# Patient Record
Sex: FEMALE | Race: WHITE | NOT HISPANIC OR LATINO | Employment: UNEMPLOYED
[De-identification: names, ages, dates, MRNs, and addresses within clinical notes are randomized per-mention and may not be internally consistent; named-entity substitution may affect disease eponyms.]

---

## 2017-09-10 ENCOUNTER — HEALTH MAINTENANCE LETTER (OUTPATIENT)
Age: 42
End: 2017-09-10

## 2021-04-06 ENCOUNTER — MEDICAL CORRESPONDENCE (OUTPATIENT)
Dept: HEALTH INFORMATION MANAGEMENT | Facility: CLINIC | Age: 46
End: 2021-04-06

## 2021-04-06 ENCOUNTER — TRANSFERRED RECORDS (OUTPATIENT)
Dept: HEALTH INFORMATION MANAGEMENT | Facility: CLINIC | Age: 46
End: 2021-04-06

## 2021-06-11 ENCOUNTER — TELEPHONE (OUTPATIENT)
Dept: RHEUMATOLOGY | Facility: CLINIC | Age: 46
End: 2021-06-11

## 2021-06-11 ENCOUNTER — VIRTUAL VISIT (OUTPATIENT)
Dept: RHEUMATOLOGY | Facility: CLINIC | Age: 46
End: 2021-06-11
Payer: COMMERCIAL

## 2021-06-11 DIAGNOSIS — K21.9 GASTROESOPHAGEAL REFLUX DISEASE WITHOUT ESOPHAGITIS: ICD-10-CM

## 2021-06-11 DIAGNOSIS — E11.9 TYPE 2 DIABETES MELLITUS WITHOUT COMPLICATION, WITHOUT LONG-TERM CURRENT USE OF INSULIN (H): ICD-10-CM

## 2021-06-11 DIAGNOSIS — J45.20 MILD INTERMITTENT ASTHMA WITHOUT COMPLICATION: ICD-10-CM

## 2021-06-11 DIAGNOSIS — H05.123 ORBITAL MYOSITIS OF BOTH SIDES: ICD-10-CM

## 2021-06-11 DIAGNOSIS — F41.9 ANXIETY: ICD-10-CM

## 2021-06-11 DIAGNOSIS — F32.A DEPRESSIVE DISORDER: ICD-10-CM

## 2021-06-11 PROCEDURE — 99203 OFFICE O/P NEW LOW 30 MIN: CPT | Mod: 95 | Performed by: STUDENT IN AN ORGANIZED HEALTH CARE EDUCATION/TRAINING PROGRAM

## 2021-06-11 RX ORDER — POLYETHYLENE GLYCOL 3350 17 G/17G
17 POWDER, FOR SOLUTION ORAL
COMMUNITY
Start: 2020-10-22 | End: 2021-10-22

## 2021-06-11 RX ORDER — MULTIPLE VITAMINS W/ MINERALS TAB 9MG-400MCG
1 TAB ORAL DAILY
COMMUNITY

## 2021-06-11 RX ORDER — MONTELUKAST SODIUM 10 MG/1
10 TABLET ORAL
COMMUNITY
Start: 2020-10-05

## 2021-06-11 RX ORDER — BUPROPION HYDROCHLORIDE 150 MG/1
TABLET ORAL
COMMUNITY
Start: 2021-02-03

## 2021-06-11 RX ORDER — LEVOTHYROXINE SODIUM 75 UG/1
75 TABLET ORAL
COMMUNITY
Start: 2020-10-05

## 2021-06-11 RX ORDER — CARVEDILOL 25 MG/1
TABLET, FILM COATED ORAL
COMMUNITY
Start: 2021-03-26

## 2021-06-11 RX ORDER — FAMOTIDINE 20 MG/1
20 TABLET, FILM COATED ORAL
COMMUNITY
Start: 2020-11-30 | End: 2021-11-30

## 2021-06-11 RX ORDER — LYSINE 500 MG
500 TABLET ORAL
COMMUNITY

## 2021-06-11 RX ORDER — ONDANSETRON 4 MG/1
4 TABLET, ORALLY DISINTEGRATING ORAL
COMMUNITY
Start: 2021-05-21

## 2021-06-11 RX ORDER — ALPRAZOLAM 0.25 MG
.25-.5 TABLET ORAL
COMMUNITY
Start: 2021-01-14

## 2021-06-11 RX ORDER — IPRATROPIUM BROMIDE AND ALBUTEROL SULFATE 2.5; .5 MG/3ML; MG/3ML
3 SOLUTION RESPIRATORY (INHALATION)
COMMUNITY
Start: 2021-02-10

## 2021-06-11 RX ORDER — DULOXETIN HYDROCHLORIDE 60 MG/1
CAPSULE, DELAYED RELEASE ORAL DAILY
COMMUNITY
Start: 2021-03-17

## 2021-06-11 RX ORDER — ARMODAFINIL 250 MG/1
TABLET ORAL
COMMUNITY
Start: 2021-02-03

## 2021-06-11 RX ORDER — PROPRANOLOL HYDROCHLORIDE 10 MG/1
TABLET ORAL
COMMUNITY
Start: 2021-05-13

## 2021-06-11 RX ORDER — HYDROXYZINE HYDROCHLORIDE 10 MG/1
10-20 TABLET, FILM COATED ORAL
COMMUNITY
Start: 2021-02-11 | End: 2022-02-11

## 2021-06-11 RX ORDER — CETIRIZINE HYDROCHLORIDE 10 MG/1
10 TABLET ORAL
COMMUNITY
Start: 2021-05-03

## 2021-06-11 NOTE — PROGRESS NOTES
Debra is a 45 year old who is being evaluated via a billable video visit.      How would you like to obtain your AVS? MyChart and mail to home address  If the video visit is dropped, the invitation should be resent by: Send to e-mail at: [email or web address]  Will anyone else be joining your video visit? Riddhi Orantes LPN    Video Start Time: 10:25 AM            Active Problem List:     Patient Active Problem List    Diagnosis Date Noted     Anxiety      Priority: Medium     Mild intermittent asthma without complication      Priority: Medium     Depressive disorder      Priority: Medium     Gastroesophageal reflux disease without esophagitis      Priority: Medium     Type 2 diabetes mellitus without complications (H)      Priority: Medium            History of Present Illness:   Debra Briones is a 45 year old female with PMH of morbid obesity, type 2 diabetes, Asthma, depression, fatigue evaluated via a billable virtual visit in consultation at request of Dr. 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    She reports that her symptoms started in 2013 with double vision.  She also developed severe pain in her head and around the left eye.  The pain was so excruciating that she had to go to the ER.  She had multiple visits to the ER, CT head was normal.  Later she developed pain, redness and tearing of the left eye.  Initially she was treated as possible conjunctivitis versus corneal abrasion but symptoms did not improve.  Later she saw ophthalmologist who treated her with antibiotics for possible preseptal cellulitis.  Symptoms did not improve, subsequently CT orbit with IV contrast was done on 7/17/2013 which revealed enlargement of left medial rectus muscle.  Mild left preseptal thickening and edema with left-sided proptosis.  She was started on 100 mg of prednisone and noticed marked improvement in her symptoms.  She was maintained on prednisone for almost 3 years and gradually it was  reduced. During that time she gained 80 pounds, developed Diabetes. She was off of it from Spring of 2016 and started again in 1/2021. She had no flare up in the interim. Started with 60 mg prednisone in 1/2021 and has tapered it gradually. She had it in another eye too this time but not that intense. She is off of prednisone for 2 weeks now. She has not had any flares after being off of it.     During the flare she gets pain and swelling outside of the orbit. Outside gets puffy and hard. She did not have double vision this time.    She has cervical radiculopathy. She gets trigger point injection in upper back area every 4 weeks. She is seeing weight loss clinic in [facility] for bariatric surgery.   She has PTSD and is doing EMDR therapy.     EMG showed carpal tunnel symptoms, PT was done which helped. She has issues with little finger which is believed it to be C8 radiculopathy.     She has asthma and is smoker, she is trying to quit, using chantix. She had IBS, cholecystectomy at age 24.     She gets recurrent sores in her mouth on the side of the tongue, like canker sores. It happens once a month. She has dry mouth, left eye is dry. No raynaud's.  Brother has iritis.      Autoimmune work-up done in 2015 showed negative ERASMO, KATHRYN panel, negative rheumatoid factor, Deana 1 antibody.    CT of the orbit done in 1/2021 showed mild inflammatory changes within the retroconal region of left orbit, associated mild asymmetric enlargement of the extraocular musculature involving the left orbit.  These findings are compatible with history of orbital inflammation.    No history of psoriasis, ulcerative colitis or chron's disease. No h/o iritis, enthesitis, finger or toe swelling like dactylitis, plantar fascitis or heel pain. Denies any raynauds, malar rash, photosensitivity, recurrent mouth/genital ulcers, pleuritic chest pains, recurrent sinusitis/rhinitis, swallowing difficulty, hearing or visual changes recently. No h/o  arterial/venous thrombosis in the past. Denies any tick bite, recent GI/ infection.             Review of Systems:     Review Of Systems  Constitutional: denies fever, chills, night sweats and weight loss.  Skin: No skin rash.  Eyes: + dryness or irritation in eyes. No episode of eye inflammation or redness.   Ears/Nose/Throat: no recurrent sinus infections.  Respiratory: No shortness of breath, dyspnea on exertion, cough, or hemoptysis  Cardiovascular: no chest pain or palpitations.  Gastrointestinal: no nausea, vomiting, abdominal pain.  Normal bowel movements.  Genitourinary: no dysuria, frequency  or hematuria.  Musculoskeletal: as in HPI  Neurologic: no numbness, tingling.  Psychiatric: no mood disorders.  Hematologic/Lymphatic/Immunologic: no history of easy bruising, petechia or purpura.  No abnormal bleeding.   Endocrine: no h/o thyroid disease or Diabetes.                  Past Medical History:     Past Medical History:   Diagnosis Date     Allergic state      Anxiety      Asthma      Depressive disorder      Gastro-oesophageal reflux disease      Type 2 diabetes mellitus without complications (H)      Past Surgical History:   Procedure Laterality Date     BREAST SURGERY       CHOLECYSTECTOMY              Social History:     Social History     Occupational History     Not on file   Tobacco Use     Smoking status: Current Every Day Smoker     Packs/day: 0.50   Substance and Sexual Activity     Alcohol use: Yes     Drug use: No     Sexual activity: Not on file            Family History:     Family History   Problem Relation Age of Onset     Iritis Brother             Allergies:     Allergies   Allergen Reactions     Animal Dander Hives and Shortness Of Breath     Guinea Pigs - asthma, hives     Feathers Hives and Shortness Of Breath     Dust Mite Extract Hives            Medications:     Current Outpatient Medications   Medication Sig Dispense Refill     Albuterol (VENTOLIN IN) Inhale into the lungs as  needed        ALPRAZolam (XANAX) 0.25 MG tablet Take 0.25-0.5 mg by mouth       ARIPiprazole (ABILIFY PO) Take 5 mg by mouth daily        armodafinil (NUVIGIL) 250 MG TABS tablet TAKE 1 TABLET BY MOUTH EVERY MORNING       blood glucose (CONTOUR NEXT TEST) test strip Test once daily. Dx code  E11.9       blood glucose monitoring (MILAD MICROLET) lancets Test once daily. DX code  E11.9       buPROPion (WELLBUTRIN XL) 150 MG 24 hr tablet TAKE 3 TABLETS BY MOUTH EVERY MORNING       BusPIRone HCl (BUSPAR PO) Take 15 mg by mouth 2 times daily        cetirizine (ZYRTEC) 10 MG tablet Take 10 mg by mouth       Dulaglutide 3 MG/0.5ML SOPN Inject 3 mg Subcutaneous once a week       DULoxetine (CYMBALTA) 60 MG capsule daily       famotidine (PEPCID) 20 MG tablet Take 20 mg by mouth       fluticasone (FLONASE) 50 MCG/ACT nasal spray Spray 2 sprays into both nostrils daily.       fluticasone-vilanterol (BREO ELLIPTA) 200-25 MCG/INH inhaler Inhale 1 puff into the lungs daily       hydrOXYzine (ATARAX) 10 MG tablet Take 10-20 mg by mouth       INULIN PO        ipratropium - albuterol 0.5 mg/2.5 mg/3 mL (DUONEB) 0.5-2.5 (3) MG/3ML neb solution Inhale 3 mLs into the lungs       L-Lysine 500 MG tablet Take 500 mg by mouth       levonorgestrel (MIRENA) 20 MCG/24HR IUD 1 each by Intrauterine route once       levothyroxine (SYNTHROID/LEVOTHROID) 75 MCG tablet Take 75 mcg by mouth       magnesium oxide (MAG-OX) 400 (241.3 Mg) MG tablet Take 400 mg by mouth       METFORMIN HCL PO Take 500 mg by mouth 2 times daily (with meals)        montelukast (SINGULAIR) 10 MG tablet Take 10 mg by mouth       multivitamin w/minerals (MULTI-VITAMIN) tablet Take 1 tablet by mouth daily       OMEPRAZOLE PO Take 20 mg by mouth every morning        ondansetron (ZOFRAN-ODT) 4 MG ODT tab Take 4 mg by mouth       polyethylene glycol (MIRALAX) 17 GM/Dose powder Take 17 g by mouth       propranolol (INDERAL) 10 MG tablet TAKE 1 TABLET BY MOUTH AM/ AT BEDTIME &  TAKE 2 TABLETS AT NOON       SIMVASTATIN PO Take 40 mg by mouth At Bedtime        ClonazePAM (KLONOPIN PO) Take  by mouth.       CONTOUR TEST test strip        Fluticasone-Salmeterol (ADVAIR DISKUS IN) Inhale  into the lungs.       meclizine (ANTIVERT) 12.5 MG tablet Take 4 tablets (50 mg) by mouth 3 times daily as needed for dizziness (Patient not taking: Reported on 6/11/2021) 30 tablet 0     PREDNISONE PO        Ranitidine HCl (ZANTAC PO)        Vilazodone HCl (VIIBRYD PO) Take  by mouth.              Physical Exam:   Vitals not taken.   Wt Readings from Last 4 Encounters:   08/14/15 120.2 kg (265 lb)   04/14/14 120.2 kg (265 lb)   07/06/13 98.9 kg (218 lb)       Constitutional: Morbid obesity appearing stated age; cooperative  MS: No synovitis or tenderness present over MCP, PIP, DIP joints.  No dactylitis, plantar fasciitis, Achilles tendinitis.  No knee effusions.  No tenderness present over MTP joints, bilateral ankles.  Psych: nl judgement, orientation, memory, affect.         Data:     No results found for any visits on 06/11/21.    Recent Labs   Lab Test 08/14/15  1220 04/14/14  1340   WBC  --  9.6   RBC  --  4.77   HGB  --  14.6   HCT  --  45.4   MCV  --  95   RDW  --  13.8   PLT  --  221   BUN 9 11      No results for input(s): TSH, T4 in the last 12407 hours.  Hemoglobin   Date Value Ref Range Status   04/14/2014 14.6 11.7 - 15.7 g/dL Final     Urea Nitrogen   Date Value Ref Range Status   08/14/2015 9 7 - 30 mg/dL Final   04/14/2014 11 5 - 24 mg/dL Final     Recent Labs   Lab Test 08/14/15  1220 04/14/14  1340   WBC  --  9.6   HGB  --  14.6   HCT  --  45.4   MCV  --  95   PLT  --  221   BUN 9 11       Outside studies reviewed: Outside records from [facility] reviewed    CT orbit-7/2013    Impression: Enlargement of the left medial rectus muscle with subtle central hypo-density. Slight  stranding of the intraconal fat. Mild left preseptal thickening and edema with left  sided proptosis. Findings are nonspecific and likely represent an inflammatory versus infectious process, for example myositis versus a peseudotumor. A neoplastic process, such as lymphoma, is considered to be within the differential although less likely given the relatively short interval of time over which this has developed since prior exam. Consider short-term repeat imaging to evaluate for resolution versus progression.     CT orbit-2014    Impression: Significant interval decrease in previously seen left medial rectus muscle thickening. No acute pathology in bilateral orbits.     9/2019-negative HIV, Treponema pallidum, hepatitis B surface antigen, hepatitis C antibody    Reviewed Rheumatology lab flowsheet    Assessment    Left orbital myositis  Prednisone responsive  Steroid-induced diabetes  Morbid obesity  Family history of iritis in brother  Asthma, depression  Negative ERASMO, rheumatoid factor, Deana 1 antibody, normal CK-2015  CT orbit - 2013 -left eye medial rectus myositis    Left orbital myositis: She developed symptoms of headache, eye pain and diplopia in 2013.  She was initially treated for possible conjunctivitis, preseptal cellulitis etc. which did not help.  CT orbit done in 2013 showed left medial rectus inflammation raising concern about orbital myositis.  She responded very well to high-dose prednisone.  She was maintained on prednisone for almost 3 years due to inability to taper.  Her autoimmune work-up done in the past has shown negative ERASMO, rheumatoid factor, Deana 1 antibody, normal CK levels.    She had flareup of left orbital myositis in 1/2021 when she was started back on 60 mg of prednisone.  She has tapered herself off of it completely 2 weeks ago.  She denies any eye symptoms now.     Isolated orbital myositis is a rare but can be seen in patients with autoimmune diseases like systemic lupus, IgG4 related disease, Sjogren's, ANCA vasculitis, sarcoidosis or idiopathic.  Her autoimmune  work-up done in 2015 was unremarkable.      I will repeat her autoimmune serologies including ERASMO, KATHRYN panel, dsDNA, C3-C4, centromere antibody, SCL 70, ANCA titers, IgG4 levels, CK, aldolase, myositis panel, HLA-B27 genotype, ESR, CRP. Her review of systems is however normal other than history of eye inflammation.    Since she is off of prednisone and has no current eye inflammation, will keep her off of the prednisone.  If symptoms flareup again then she will benefit from a steroid sparing agent for long-term treatment.  Options can include DMARDs like methotrexate, Azathioprine, cellcept based on tolerance.  There are some reports of use of rituximab or anti-TNF inihibitors for patients with severe refractory disease.    Long-term steroid use: She has developed her morbid obesity, type 2 diabetes related to long-term steroid use.  She is following up with bariatric clinic for weight loss surgery.      Plan     Blood tests ordered. Please send the orders to [facility]    Follow up in 1 month. If not available then I can see her on July 19th, 12:15        Video-Visit Details    Type of service:  Video Visit    Video End Time:11:00 am     Originating Location (pt. Location): Home    Distant Location (provider location):  [facility]     Platform used for Video Visit: [employer]

## 2021-06-11 NOTE — TELEPHONE ENCOUNTER
Health Call Center    Phone Message    May a detailed message be left on voicemail: yes, [phone]       Reason for Call: Other: [facility] lab is calling and received orders that they are not able to read. They said they received 41 pages. They just need orders, when the labs are needed, diagnosis and Doctor signature. Call back number is  [phone] fax number [phone]    Action Taken: Message routed to:  Adult Clinics: Rheumatology p [phone]    Travel Screening: Not Applicable

## 2021-06-11 NOTE — PATIENT INSTRUCTIONS
Blood tests ordered. Please send the orders to [facility]    Follow up in 1 month. If not available then I can see her on July 19th, 12:15

## 2021-06-11 NOTE — NURSING NOTE
Called and spoke with pt.  Assisted with scheduling Future appt recommended by Dr. Strickland:      Instructions:  Blood tests ordered. Please send the orders to [facility]     Follow up in 1 month. If not available then I can see her on July 19th, 12:15     Future Lab Orders Faxed to [facility] [phone].  This LPN called and obtained Fax number [phone].  Cover sheet used requesting results be Faxed back to Dr. Strickland.  Confirmation Success using RightFax.  Pt will call and schedule her own appt for this.    Sent pt the email link to [employer] sign-up.    Encouraged to call if any further questions or concerns.  AVS printed and mailed to pt.    Elyse Orantes LPN

## 2021-06-14 NOTE — TELEPHONE ENCOUNTER
Lab orders refaxed to [facility] at [phone]. Confirmed successful via right fax. Informed [facility] lab that orders were refaxed, orders were readable.      RADHA Leung [facility]

## 2021-06-17 NOTE — TELEPHONE ENCOUNTER
Pt is calling because she has a lab appt scheduled in [facility] tomorrow, 6/17/21, and is wondering if she needs to be fasting for the labs ordered by Dr. Strickland.    Please call the Pt back to let her know if she needs to be fasting or not.  Ok to leave detailed message if it goes to a voicemail.

## 2021-06-17 NOTE — TELEPHONE ENCOUNTER
Huddled with Dr. Strickland and patient does not need to be fasting. Patient informed of message.      RADHA Leung [facility]

## 2021-06-24 ENCOUNTER — TELEPHONE (OUTPATIENT)
Dept: RHEUMATOLOGY | Facility: CLINIC | Age: 46
End: 2021-06-24

## 2021-06-24 NOTE — TELEPHONE ENCOUNTER
Please review labs collected on 6/18/2021 from [facility] in [facility] everywhere. Thank you.      RADHA Leung [facility]

## 2021-06-29 NOTE — TELEPHONE ENCOUNTER
Labs done at [facility] reviewed.  All her autoimmune work-up is normal.  I checked for diseases like systemic lupus, Sjogren's, mixed connective tissue disease, vasculitis and her tests for those diseases are normal.  Muscle enzyme, CBC, liver, kidney function test and inflammatory markers are normal.  We will discuss further on her follow-up visit.  If she has any flare let us know.

## 2021-06-30 NOTE — TELEPHONE ENCOUNTER
Verified patient viewed results via [employer].      RADHA Leung [facility]

## 2021-07-19 ENCOUNTER — VIRTUAL VISIT (OUTPATIENT)
Dept: RHEUMATOLOGY | Facility: CLINIC | Age: 46
End: 2021-07-19
Payer: COMMERCIAL

## 2021-07-19 DIAGNOSIS — D80.1 HYPOGAMMAGLOBULINEMIA (H): Primary | ICD-10-CM

## 2021-07-19 PROCEDURE — 99213 OFFICE O/P EST LOW 20 MIN: CPT | Mod: 95 | Performed by: STUDENT IN AN ORGANIZED HEALTH CARE EDUCATION/TRAINING PROGRAM

## 2021-07-19 NOTE — PROGRESS NOTES
Debra is a 45 year old who is being evaluated via a billable video visit.      How would you like to obtain your AVS? MyChart  If the video visit is dropped, the invitation should be resent by: Send to e-mail at: [email or web address]  Will anyone else be joining your video visit? No      Video Start Time: 12:10 PM         Active Problem List:     Patient Active Problem List    Diagnosis Date Noted     Anxiety      Priority: Medium     Mild intermittent asthma without complication      Priority: Medium     Depressive disorder      Priority: Medium     Gastroesophageal reflux disease without esophagitis      Priority: Medium     Type 2 diabetes mellitus without complications (H)      Priority: Medium            History of Present Illness:   Debra Briones is a 45 year old female with PMH of morbid obesity, type 2 diabetes, Asthma, depression, fatigue evaluated via a billable virtual visit in consultation at request of Dr. 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    She reports that her symptoms started in 2013 with double vision.  She also developed severe pain in her head and around the left eye.  The pain was so excruciating that she had to go to the ER.  She had multiple visits to the ER, CT head was normal.  Later she developed pain, redness and tearing of the left eye.  Initially she was treated as possible conjunctivitis versus corneal abrasion but symptoms did not improve.  Later she saw ophthalmologist who treated her with antibiotics for possible preseptal cellulitis.  Symptoms did not improve, subsequently CT orbit with IV contrast was done on 7/17/2013 which revealed enlargement of left medial rectus muscle.  Mild left preseptal thickening and edema with left-sided proptosis.  She was started on 100 mg of prednisone and noticed marked improvement in her symptoms.  She was maintained on prednisone for almost 3 years and gradually it was reduced. During that time she gained 80 pounds, developed  Diabetes. She was off of it from Spring of 2016 and started again in 1/2021. She had no flare up in the interim. Started with 60 mg prednisone in 1/2021 and has tapered it gradually. She had it in another eye too this time but not that intense. She is off of prednisone for 2 weeks now. She has not had any flares after being off of it.     During the flare she gets pain and swelling outside of the orbit. Outside gets puffy and hard. She did not have double vision this time.    She has cervical radiculopathy. She gets trigger point injection in upper back area every 4 weeks. She is seeing weight loss clinic in [facility] for bariatric surgery.   She has PTSD and is doing EMDR therapy.     EMG showed carpal tunnel symptoms, PT was done which helped. She has issues with little finger which is believed it to be C8 radiculopathy.     She has asthma and is smoker, she is trying to quit, using chantix. She had IBS, cholecystectomy at age 24.     She gets recurrent sores in her mouth on the side of the tongue, like canker sores. It happens once a month. She has dry mouth, left eye is dry. No raynaud's.  Brother has iritis.      Autoimmune work-up done in 2015 showed negative ERASMO, KATHRYN panel, negative rheumatoid factor, Deana 1 antibody.    CT of the orbit done in 1/2021 showed mild inflammatory changes within the retroconal region of left orbit, associated mild asymmetric enlargement of the extraocular musculature involving the left orbit.  These findings are compatible with history of orbital inflammation.    No history of psoriasis, ulcerative colitis or chron's disease. No h/o iritis, enthesitis, finger or toe swelling like dactylitis, plantar fascitis or heel pain. Denies any raynauds, malar rash, photosensitivity, recurrent mouth/genital ulcers, pleuritic chest pains, recurrent sinusitis/rhinitis, swallowing difficulty, hearing or visual changes recently. No h/o arterial/venous thrombosis in the past. Denies any tick bite,  recent GI/ infection.     July 19, 2021 - Her eyes are doing ok. She is off of steroids. Her autoimmune work up showed negative KATHRYN panel, dsDNA, centromere, myositis panel, ANCA titers, ESR, CRP, rheumatoid factor, QuantiFERON-TB gold, CK, aldolase.  Her IgG levels were low. She denies history of recurrent sinusitis, UTI but has history of chronic allergies.             Review of Systems:     Review Of Systems  Constitutional: denies fever, chills, night sweats and weight loss.  Skin: No skin rash.  Eyes: + dryness or irritation in eyes. No episode of eye inflammation or redness.   Ears/Nose/Throat: no recurrent sinus infections.  Respiratory: No shortness of breath, dyspnea on exertion, cough, or hemoptysis  Cardiovascular: no chest pain or palpitations.  Gastrointestinal: no nausea, vomiting, abdominal pain.  Normal bowel movements.  Genitourinary: no dysuria, frequency  or hematuria.  Musculoskeletal: as in HPI  Neurologic: no numbness, tingling.  Psychiatric: no mood disorders.  Hematologic/Lymphatic/Immunologic: no history of easy bruising, petechia or purpura.  No abnormal bleeding.   Endocrine: no h/o thyroid disease or Diabetes.                  Past Medical History:     Past Medical History:   Diagnosis Date     Allergic state      Anxiety      Asthma      Depressive disorder      Gastro-oesophageal reflux disease      Type 2 diabetes mellitus without complications (H)      Past Surgical History:   Procedure Laterality Date     BREAST SURGERY       CHOLECYSTECTOMY              Social History:     Social History     Occupational History     Not on file   Tobacco Use     Smoking status: Current Every Day Smoker     Packs/day: 0.50   Substance and Sexual Activity     Alcohol use: Yes     Drug use: No     Sexual activity: Not on file            Family History:     Family History   Problem Relation Age of Onset     Iritis Brother             Allergies:     Allergies   Allergen Reactions     Animal Dander  Hives and Shortness Of Breath     Guinea Pigs - asthma, hives     Feathers Hives and Shortness Of Breath     Dust Mite Extract Hives            Medications:     Current Outpatient Medications   Medication Sig Dispense Refill     Albuterol (VENTOLIN IN) Inhale into the lungs as needed        ALPRAZolam (XANAX) 0.25 MG tablet Take 0.25-0.5 mg by mouth       armodafinil (NUVIGIL) 250 MG TABS tablet TAKE 1 TABLET BY MOUTH EVERY MORNING       blood glucose (CONTOUR NEXT TEST) test strip Test once daily. Dx code  E11.9       blood glucose monitoring (MILAD MICROLET) lancets Test once daily. DX code  E11.9       buPROPion (WELLBUTRIN XL) 150 MG 24 hr tablet TAKE 3 TABLETS BY MOUTH EVERY MORNING       BusPIRone HCl (BUSPAR PO) Take 15 mg by mouth 2 times daily        cetirizine (ZYRTEC) 10 MG tablet Take 10 mg by mouth       CONTOUR TEST test strip        Dulaglutide 3 MG/0.5ML SOPN Inject 3 mg Subcutaneous once a week       DULoxetine (CYMBALTA) 60 MG capsule daily       famotidine (PEPCID) 20 MG tablet Take 20 mg by mouth       fluticasone (FLONASE) 50 MCG/ACT nasal spray Spray 2 sprays into both nostrils daily.       fluticasone-vilanterol (BREO ELLIPTA) 200-25 MCG/INH inhaler Inhale 1 puff into the lungs daily       hydrOXYzine (ATARAX) 10 MG tablet Take 10-20 mg by mouth       INULIN PO        ipratropium - albuterol 0.5 mg/2.5 mg/3 mL (DUONEB) 0.5-2.5 (3) MG/3ML neb solution Inhale 3 mLs into the lungs       L-Lysine 500 MG tablet Take 500 mg by mouth       levonorgestrel (MIRENA) 20 MCG/24HR IUD 1 each by Intrauterine route once       levothyroxine (SYNTHROID/LEVOTHROID) 75 MCG tablet Take 75 mcg by mouth       magnesium oxide (MAG-OX) 400 (241.3 Mg) MG tablet Take 400 mg by mouth       METFORMIN HCL PO Take 500 mg by mouth 2 times daily (with meals)        montelukast (SINGULAIR) 10 MG tablet Take 10 mg by mouth       multivitamin w/minerals (MULTI-VITAMIN) tablet Take 1 tablet by mouth daily       ondansetron  (ZOFRAN-ODT) 4 MG ODT tab Take 4 mg by mouth       polyethylene glycol (MIRALAX) 17 GM/Dose powder Take 17 g by mouth       PREDNISONE PO        propranolol (INDERAL) 10 MG tablet TAKE 1 TABLET BY MOUTH AM/ AT BEDTIME & TAKE 2 TABLETS AT NOON       SIMVASTATIN PO Take 40 mg by mouth At Bedtime               Physical Exam:   Vitals not taken.   Wt Readings from Last 4 Encounters:   08/14/15 120.2 kg (265 lb)   04/14/14 120.2 kg (265 lb)   07/06/13 98.9 kg (218 lb)       Constitutional: Morbid obesity appearing stated age; cooperative  MS: No synovitis or tenderness present over MCP, PIP, DIP joints.  No dactylitis, plantar fasciitis, Achilles tendinitis.  No knee effusions.  No tenderness present over MTP joints, bilateral ankles.  Psych: nl judgement, orientation, memory, affect.         Data:     No results found for any visits on 07/19/21.    Recent Labs   Lab Test 08/14/15  1220 04/14/14  1340   WBC  --  9.6   RBC  --  4.77   HGB  --  14.6   HCT  --  45.4   MCV  --  95   RDW  --  13.8   PLT  --  221   BUN 9 11      No results for input(s): TSH, T4 in the last 09121 hours.  Hemoglobin   Date Value Ref Range Status   04/14/2014 14.6 11.7 - 15.7 g/dL Final     Urea Nitrogen   Date Value Ref Range Status   08/14/2015 9 7 - 30 mg/dL Final   04/14/2014 11 5 - 24 mg/dL Final     Recent Labs   Lab Test 08/14/15  1220 04/14/14  1340   WBC  --  9.6   HGB  --  14.6   HCT  --  45.4   MCV  --  95   PLT  --  221   BUN 9 11       Outside studies reviewed: Outside records from [facility] reviewed    CT orbit-7/2013    Impression: Enlargement of the left medial rectus muscle with subtle central hypo-density. Slight  stranding of the intraconal fat. Mild left preseptal thickening and edema with left sided proptosis. Findings are nonspecific and likely represent an inflammatory versus infectious process, for example myositis versus a peseudotumor. A neoplastic process, such as lymphoma, is considered  to be within the differential although less likely given the relatively short interval of time over which this has developed since prior exam. Consider short-term repeat imaging to evaluate for resolution versus progression.     CT orbit-2014    Impression: Significant interval decrease in previously seen left medial rectus muscle thickening. No acute pathology in bilateral orbits.     9/2019-negative HIV, Treponema pallidum, hepatitis B surface antigen, hepatitis C antibody    Reviewed Rheumatology lab flowsheet    Assessment    Left orbital myositis  Prednisone responsive  Steroid-induced diabetes  Morbid obesity  Family history of iritis in brother  Asthma, depression  Negative ERASMO, rheumatoid factor, Deana 1 antibody, normal CK-2015  CT orbit - 2013 -left eye medial rectus myositis    Left orbital myositis: She developed symptoms of headache, eye pain and diplopia in 2013.  She was initially treated for possible conjunctivitis, preseptal cellulitis etc. which did not help.  CT orbit done in 2013 showed left medial rectus inflammation raising concern about orbital myositis.  She responded very well to high-dose prednisone.  She was maintained on prednisone for almost 3 years due to inability to taper.  Her autoimmune work-up done in the past has shown negative ERASMO, rheumatoid factor, Deana 1 antibody, normal CK levels.    She had flareup of left orbital myositis in 1/2021 when she was started back on 60 mg of prednisone.  She has tapered herself off of it completely 2 weeks ago.  She denies any eye symptoms now.     Isolated orbital myositis is a rare but can be seen in patients with autoimmune diseases like systemic lupus, IgG4 related disease, Sjogren's, ANCA vasculitis, sarcoidosis or idiopathic.  Her autoimmune work-up done in 2015 was unremarkable.      Her autoimmune serologies including ERASMO, KATHRYN panel, dsDNA, C3-C4, centromere antibody, SCL 70, ANCA titers, IgG4 levels, CK, aldolase, myositis panel, ESR, CRP can  back normal.     Since she is off of prednisone and has no current eye inflammation, will keep her off of the prednisone.  If symptoms flareup again then she will benefit from a steroid sparing agent for long-term treatment.  Options can include DMARDs like methotrexate, Azathioprine, cellcept based on tolerance.  There are some reports of use of rituximab or anti-TNF inihibitors for patients with severe refractory disease.    Low IgG/hypogammaglobinemia : Her autoimmune work-up showed low IgG values of 435.  I will refer her to immunology for hypogammaglobinemia work-up.    Long-term steroid use: She has developed morbid obesity, type 2 diabetes related to long-term steroid use.  She is following up with bariatric clinic for weight loss surgery.  She has changed her diet to lose more weight.       Plan     Immunology referral given    Follow-up in 4 months      Video-Visit Details    Type of service:  Video Visit    Video End Time:12:30 PM     Originating Location (pt. Location): Home    Distant Location (provider location):  [facility]     Platform used for Video Visit: Gato Strickland MD  [facility]  Department of Rheumatology & Autoimmune Disorders  [facility]: [phone]  Pager - [phone]

## 2021-07-19 NOTE — NURSING NOTE
Follow up appointment scheduled.Left message for patient to check mychart. If appointment date and time does not work, patient to call clinic to reschedule.    Krista Randhawa MA  [facility] Rheumat[facility]

## 2021-07-26 ENCOUNTER — TELEPHONE (OUTPATIENT)
Dept: RHEUMATOLOGY | Facility: CLINIC | Age: 46
End: 2021-07-26

## 2021-07-26 ENCOUNTER — MYC MEDICAL ADVICE (OUTPATIENT)
Dept: RHEUMATOLOGY | Facility: CLINIC | Age: 46
End: 2021-07-26

## 2021-07-26 NOTE — TELEPHONE ENCOUNTER
Patient has questions about the Immunology referral that Dr Strickland put in for her. She is not sure where she should call to schedule an appointment. The referral mentions [facility] in [place], but patient was hoping for something more local to [facility]. Please give her a call back. [id number]

## 2021-07-26 NOTE — TELEPHONE ENCOUNTER
Please advise if there is as specific location where she should go to? Thank you.      RADHA Leung [facility]

## 2021-07-27 NOTE — TELEPHONE ENCOUNTER
She can be seen at any immunology clinic. If there is immunologist in [place] or [facility] then she can seen them. Otherwise there is [facility] too.

## 2021-07-27 NOTE — TELEPHONE ENCOUNTER
Yes she can be seen at any place which is closer to her home. If [facility] has immunologist then she can see them.

## 2021-07-28 NOTE — TELEPHONE ENCOUNTER
Patient informed of provider's message. She will do some research and get back to the clinic on where she would like to have referral faxed to.      RADHA Leung [facility]

## 2021-07-30 ENCOUNTER — TELEPHONE (OUTPATIENT)
Dept: RHEUMATOLOGY | Facility: CLINIC | Age: 46
End: 2021-07-30

## 2021-07-30 NOTE — TELEPHONE ENCOUNTER
Patient would like to go to [facility] for Immunology Care.  Please fax referral and records to [facility] @ [phone] Att: Dr. Hilton.

## 2021-07-31 ENCOUNTER — HEALTH MAINTENANCE LETTER (OUTPATIENT)
Age: 46
End: 2021-07-31

## 2021-08-02 NOTE — TELEPHONE ENCOUNTER
Referral faxed to [phone]. Confirmed successful via right fax. Patient informed referral has been faxed and she should be able to schedule an appointment now.      RADHA Leung [facility]

## 2021-09-25 ENCOUNTER — HEALTH MAINTENANCE LETTER (OUTPATIENT)
Age: 46
End: 2021-09-25

## 2021-11-16 NOTE — PROGRESS NOTES
Debra is a 46 year old who is being evaluated via a billable video visit.      How would you like to obtain your AVS? MyChart  If the video visit is dropped, the invitation should be resent by: Send to e-mail at: [email or web address]  Will anyone else be joining your video visit? No      Video Start Time: 11:07 AM         Active Problem List:     Patient Active Problem List    Diagnosis Date Noted     Anxiety      Priority: Medium     Mild intermittent asthma without complication      Priority: Medium     Depressive disorder      Priority: Medium     Gastroesophageal reflux disease without esophagitis      Priority: Medium     Type 2 diabetes mellitus without complications (H)      Priority: Medium            History of Present Illness:   Debra Briones is a 46 year old female with PMH of morbid obesity, type 2 diabetes, Asthma, depression, fatigue evaluated via a billable virtual visit in consultation at request of Dr. 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    She reports that her symptoms started in 2013 with double vision.  She also developed severe pain in her head and around the left eye.  The pain was so excruciating that she had to go to the ER.  She had multiple visits to the ER, CT head was normal.  Later she developed pain, redness and tearing of the left eye.  Initially she was treated as possible conjunctivitis versus corneal abrasion but symptoms did not improve.  Later she saw ophthalmologist who treated her with antibiotics for possible preseptal cellulitis.  Symptoms did not improve, subsequently CT orbit with IV contrast was done on 7/17/2013 which revealed enlargement of left medial rectus muscle.  Mild left preseptal thickening and edema with left-sided proptosis.  She was started on 100 mg of prednisone and noticed marked improvement in her symptoms.  She was maintained on prednisone for almost 3 years and gradually it was reduced. During that time she gained 80 pounds, developed  Diabetes. She was off of it from Spring of 2016 and started again in 1/2021. She had no flare up in the interim. Started with 60 mg prednisone in 1/2021 and has tapered it gradually. She had it in another eye too this time but not that intense. She is off of prednisone for 2 weeks now. She has not had any flares after being off of it.     During the flare she gets pain and swelling outside of the orbit. Outside gets puffy and hard. She did not have double vision this time.    She has cervical radiculopathy. She gets trigger point injection in upper back area every 4 weeks. She is seeing weight loss clinic in [facility] for bariatric surgery.   She has PTSD and is doing EMDR therapy.     EMG showed carpal tunnel symptoms, PT was done which helped. She has issues with little finger which is believed it to be C8 radiculopathy.     She has asthma and is smoker, she is trying to quit, using chantix. She had IBS, cholecystectomy at age 24.     She gets recurrent sores in her mouth on the side of the tongue, like canker sores. It happens once a month. She has dry mouth, left eye is dry. No raynaud's.  Brother has iritis.      Autoimmune work-up done in 2015 showed negative ERASMO, KATHRYN panel, negative rheumatoid factor, Deana 1 antibody.    CT of the orbit done in 1/2021 showed mild inflammatory changes within the retroconal region of left orbit, associated mild asymmetric enlargement of the extraocular musculature involving the left orbit.  These findings are compatible with history of orbital inflammation.    No history of psoriasis, ulcerative colitis or chron's disease. No h/o iritis, enthesitis, finger or toe swelling like dactylitis, plantar fascitis or heel pain. Denies any raynauds, malar rash, photosensitivity, recurrent mouth/genital ulcers, pleuritic chest pains, recurrent sinusitis/rhinitis, swallowing difficulty, hearing or visual changes recently. No h/o arterial/venous thrombosis in the past. Denies any tick bite,  recent GI/ infection.     July 19, 2021 - Her eyes are doing ok. She is off of steroids. Her autoimmune work up showed negative KATHRYN panel, dsDNA, centromere, myositis panel, ANCA titers, ESR, CRP, rheumatoid factor, QuantiFERON-TB gold, CK, aldolase.  Her IgG levels were low. She denies history of recurrent sinusitis, UTI but has history of chronic allergies.     November 22, 2021 - Her eyes are doing ok, no myositis flare. She is not on steroids or immune suppression. She had carpal tunnel surgery on 11/3/21 and is doing well, she has no pain in her right hand. She was scheduled for left carpal tunnel syndrome but her COVID - 19 test came back positive on 11/14/21.  She received monoclonal antibody. She went to the ER for chest pain last night and all her blood work, EKG, CXR came back negative. She is going to pain clinic for trigger point injections. In 10/2021 she had steroid epidural injection in her lumbar spine. She is following with Bariatric clinic and will have to abstain for marijuana use for 1 year before she can get bariatric surgery. If she gets medical marijuana approved then surgery can be done in 6 months.             Review of Systems:     Review Of Systems  Constitutional: denies fever, chills, night sweats and weight loss.  Skin: No skin rash.  Eyes: + dryness or irritation in eyes. No episode of eye inflammation or redness.   Ears/Nose/Throat: no recurrent sinus infections.  Respiratory: No shortness of breath, dyspnea on exertion, cough, or hemoptysis  Cardiovascular: no chest pain or palpitations.  Gastrointestinal: no nausea, vomiting, abdominal pain.  Normal bowel movements.  Genitourinary: no dysuria, frequency  or hematuria.  Musculoskeletal: as in HPI  Neurologic: no numbness, tingling.  Psychiatric: no mood disorders.  Hematologic/Lymphatic/Immunologic: no history of easy bruising, petechia or purpura.  No abnormal bleeding.   Endocrine: no h/o thyroid disease or Diabetes.                   Past Medical History:     Past Medical History:   Diagnosis Date     Allergic state      Anxiety      Asthma      Depressive disorder      Gastro-oesophageal reflux disease      Type 2 diabetes mellitus without complications (H)      Past Surgical History:   Procedure Laterality Date     BREAST SURGERY       CHOLECYSTECTOMY              Social History:     Social History     Occupational History     Not on file   Tobacco Use     Smoking status: Current Every Day Smoker     Packs/day: 0.50     Smokeless tobacco: Never Used     Tobacco comment: will quit december 6   Substance and Sexual Activity     Alcohol use: Yes     Drug use: No     Sexual activity: Not Currently            Family History:     Family History   Problem Relation Age of Onset     Iritis Brother             Allergies:     Allergies   Allergen Reactions     Animal Dander Hives and Shortness Of Breath     Guinea Pigs - asthma, hives     Feathers Hives and Shortness Of Breath     Other Environmental Allergy Hives and Other (See Comments)     Asthma trigger. Dust     Dust Mite Extract Hives            Medications:     Current Outpatient Medications   Medication Sig Dispense Refill     Albuterol (VENTOLIN IN) Inhale into the lungs as needed        ALPRAZolam (XANAX) 0.25 MG tablet Take 0.25-0.5 mg by mouth       armodafinil (NUVIGIL) 250 MG TABS tablet TAKE 1 TABLET BY MOUTH EVERY MORNING       blood glucose monitoring (PolyActiva MICROLET) lancets Test once daily. DX code  E11.9       BusPIRone HCl (BUSPAR PO) Take 15 mg by mouth 2 times daily        cetirizine (ZYRTEC) 10 MG tablet Take 10 mg by mouth       CONTOUR TEST test strip        famotidine (PEPCID) 20 MG tablet Take 20 mg by mouth       fluticasone (FLONASE) 50 MCG/ACT nasal spray Spray 2 sprays into both nostrils daily.       fluticasone-vilanterol (BREO ELLIPTA) 200-25 MCG/INH inhaler Inhale 1 puff into the lungs daily       hydrOXYzine (ATARAX) 10 MG tablet Take 10-20 mg by mouth       INULIN  PO        ipratropium - albuterol 0.5 mg/2.5 mg/3 mL (DUONEB) 0.5-2.5 (3) MG/3ML neb solution Inhale 3 mLs into the lungs       L-Lysine 500 MG tablet Take 500 mg by mouth       levonorgestrel (MIRENA) 20 MCG/24HR IUD 1 each by Intrauterine route once       levothyroxine (SYNTHROID/LEVOTHROID) 75 MCG tablet Take 75 mcg by mouth       magnesium oxide (MAG-OX) 400 (241.3 Mg) MG tablet Take 400 mg by mouth       METFORMIN HCL PO Take 500 mg by mouth 2 times daily (with meals)        montelukast (SINGULAIR) 10 MG tablet Take 10 mg by mouth       multivitamin w/minerals (MULTI-VITAMIN) tablet Take 1 tablet by mouth daily       ondansetron (ZOFRAN-ODT) 4 MG ODT tab Take 4 mg by mouth       PREDNISONE PO        propranolol (INDERAL) 10 MG tablet TAKE 1 TABLET BY MOUTH AM/ AT BEDTIME & TAKE 2 TABLETS AT NOON       SIMVASTATIN PO Take 40 mg by mouth At Bedtime        blood glucose (CONTOUR NEXT TEST) test strip Test once daily. Dx code  E11.9       buPROPion (WELLBUTRIN XL) 150 MG 24 hr tablet TAKE 3 TABLETS BY MOUTH EVERY MORNING       Dulaglutide 3 MG/0.5ML SOPN Inject 3 mg Subcutaneous once a week       DULoxetine (CYMBALTA) 60 MG capsule daily              Physical Exam:   Vitals not taken.   Wt Readings from Last 4 Encounters:   08/14/15 120.2 kg (265 lb)   04/14/14 120.2 kg (265 lb)   07/06/13 98.9 kg (218 lb)       Constitutional: Morbid obesity appearing stated age; cooperative  MS: No synovitis or tenderness present over MCP, PIP, DIP joints.  No dactylitis, plantar fasciitis, Achilles tendinitis.  No knee effusions.  No tenderness present over MTP joints, bilateral ankles.  Psych: nl judgement, orientation, memory, affect.         Data:     No results found for any visits on 11/22/21.    Recent Labs   Lab Test 08/14/15  1220 04/14/14  1340   WBC  --  9.6   RBC  --  4.77   HGB  --  14.6   HCT  --  45.4   MCV  --  95   RDW  --  13.8   PLT  --  221   BUN 9 11      No results for input(s): TSH, T4 in the last 14563  hours.  Hemoglobin   Date Value Ref Range Status   04/14/2014 14.6 11.7 - 15.7 g/dL Final     Urea Nitrogen   Date Value Ref Range Status   08/14/2015 9 7 - 30 mg/dL Final   04/14/2014 11 5 - 24 mg/dL Final     Recent Labs   Lab Test 08/14/15  1220 04/14/14  1340   WBC  --  9.6   HGB  --  14.6   HCT  --  45.4   MCV  --  95   PLT  --  221   BUN 9 11       Outside studies reviewed: Outside records from [facility] reviewed    CT orbit-7/2013    Impression: Enlargement of the left medial rectus muscle with subtle central hypo-density. Slight  stranding of the intraconal fat. Mild left preseptal thickening and edema with left sided proptosis. Findings are nonspecific and likely represent an inflammatory versus infectious process, for example myositis versus a peseudotumor. A neoplastic process, such as lymphoma, is considered to be within the differential although less likely given the relatively short interval of time over which this has developed since prior exam. Consider short-term repeat imaging to evaluate for resolution versus progression.     CT orbit-2014    Impression: Significant interval decrease in previously seen left medial rectus muscle thickening. No acute pathology in bilateral orbits.     9/2019-negative HIV, Treponema pallidum, hepatitis B surface antigen, hepatitis C antibody    Records from [facility] reviewed. ER visit note from yesterday reviewed :    EXAM: XR CHEST PA AND LAT INDICATION: cp sob COMPARISON: 11/15/2021 FINDINGS: The cardiomediastinal silhouette and vasculature are within normal limits. No focal consolidation. Costophrenic angles are clear. No evidence of pneumothorax. IMPRESSION: No acute pulmonary disease.     Normal CBC, CMP, troponin, Procalcitonin, CXR, EKG.     Reviewed Rheumatology lab flowsheet    Assessment    Left orbital myositis  Prednisone responsive  Steroid-induced diabetes  Morbid obesity  Family history of iritis in brother  Asthma,  depression  Negative ERASMO, rheumatoid factor, Deana 1 antibody, normal CK-2015  CT orbit - 2013 -left eye medial rectus myositis    Left orbital myositis: She developed symptoms of headache, eye pain and diplopia in 2013.  She was initially treated for possible conjunctivitis, preseptal cellulitis etc. which did not help.  CT orbit done in 2013 showed left medial rectus inflammation raising concern about orbital myositis.  She responded very well to high-dose prednisone.  She was maintained on prednisone for almost 3 years due to inability to taper.  Her autoimmune work-up done in the past has shown negative ERASMO, rheumatoid factor, Deana 1 antibody, normal CK levels.    She had flareup of left orbital myositis in 1/2021 when she was started back on 60 mg of prednisone.  She has tapered herself off of it completely.  She denies any eye symptoms now.     Isolated orbital myositis is a rare but can be seen in patients with autoimmune diseases like systemic lupus, IgG4 related disease, Sjogren's, ANCA vasculitis, sarcoidosis or idiopathic.  Her autoimmune work-up done in 2015 was unremarkable.      Her autoimmune serologies including ERASMO, KATHRYN panel, dsDNA, C3-C4, centromere antibody, SCL 70, ANCA titers, IgG4 levels, CK, aldolase, myositis panel, ESR, CRP can back normal.     Since she is off of prednisone and has no current eye inflammation, will keep her off of the prednisone.  If symptoms flareup again then she will benefit from a steroid sparing agent for long-term treatment.  Options can include DMARDs like methotrexate, Azathioprine, cellcept based on tolerance.  There are some reports of use of rituximab or anti-TNF inihibitors for patients with severe refractory disease.    Long-term steroid use: She has developed morbid obesity, type 2 diabetes related to long-term steroid use.  She is following up with bariatric clinic for weight loss surgery.  She has changed her diet to lose more weight.     Myofascial pain and  morbid Obesity : Follows with pain clinic and bariatric clinic. She gets trigger point injections, KARYNA and will be starting pool therapy.       Plan     No orbital myositis symptoms    Agree with pool therapy, massage therapy for myofascial pain and weight loss.     Will follow up as needed     Video-Visit Details    Type of service:  Video Visit    Video End Time:11:25 am     Originating Location (pt. Location): Home    Distant Location (provider location):  [facility]     Platform used for Video Visit: Gato Strickland MD  [facility]  Department of Rheumatology & Autoimmune Disorders  [facility]: [phone]  Pager - [phone]

## 2021-11-20 ENCOUNTER — HEALTH MAINTENANCE LETTER (OUTPATIENT)
Age: 46
End: 2021-11-20

## 2021-11-22 ENCOUNTER — VIRTUAL VISIT (OUTPATIENT)
Dept: RHEUMATOLOGY | Facility: CLINIC | Age: 46
End: 2021-11-22
Payer: MEDICARE

## 2021-11-22 DIAGNOSIS — H05.123 ORBITAL MYOSITIS OF BOTH SIDES: Primary | ICD-10-CM

## 2021-11-22 PROCEDURE — 99213 OFFICE O/P EST LOW 20 MIN: CPT | Mod: 95 | Performed by: STUDENT IN AN ORGANIZED HEALTH CARE EDUCATION/TRAINING PROGRAM

## 2021-11-22 NOTE — PATIENT INSTRUCTIONS
No orbital myositis symptoms    Agree with pool therapy, massage therapy for myofascial pain and weight loss.     Will follow up as needed

## 2022-03-12 ENCOUNTER — HEALTH MAINTENANCE LETTER (OUTPATIENT)
Age: 47
End: 2022-03-12

## 2022-05-31 ENCOUNTER — TELEPHONE (OUTPATIENT)
Dept: RHEUMATOLOGY | Facility: CLINIC | Age: 47
End: 2022-05-31
Payer: MEDICARE

## 2022-05-31 NOTE — TELEPHONE ENCOUNTER
[facility] Call Center    Phone Message    May a detailed message be left on voicemail: yes     Reason for Call: Other: [facility] completed labs in 6/2021. They missed the HLA B27 lab. Patient was just in for labs for a different provider and the lab is wondering if they should add the HLA B27.     Action Taken: Message routed to:  Adult Clinics: Rheumatology p [phone]    Travel Screening: Not Applicable

## 2022-05-31 NOTE — TELEPHONE ENCOUNTER
Called lab. Informed them the lab did not need to be done. Patient hasn't needed follow up.     Aliya Carranza, MSN, RN   Rheumatology RN Care Coordinator   [facility]   [phone]

## 2022-07-02 ENCOUNTER — HEALTH MAINTENANCE LETTER (OUTPATIENT)
Age: 47
End: 2022-07-02

## 2023-04-22 ENCOUNTER — HEALTH MAINTENANCE LETTER (OUTPATIENT)
Age: 48
End: 2023-04-22

## 2023-09-23 ENCOUNTER — HEALTH MAINTENANCE LETTER (OUTPATIENT)
Age: 48
End: 2023-09-23

## 2024-02-10 ENCOUNTER — HEALTH MAINTENANCE LETTER (OUTPATIENT)
Age: 49
End: 2024-02-10

## 2024-06-29 ENCOUNTER — HEALTH MAINTENANCE LETTER (OUTPATIENT)
Age: 49
End: 2024-06-29

## 2024-11-16 ENCOUNTER — HEALTH MAINTENANCE LETTER (OUTPATIENT)
Age: 49
End: 2024-11-16